# Patient Record
Sex: FEMALE | Race: BLACK OR AFRICAN AMERICAN | NOT HISPANIC OR LATINO | Employment: FULL TIME | URBAN - METROPOLITAN AREA
[De-identification: names, ages, dates, MRNs, and addresses within clinical notes are randomized per-mention and may not be internally consistent; named-entity substitution may affect disease eponyms.]

---

## 2017-01-13 ENCOUNTER — TRANSCRIBE ORDERS (OUTPATIENT)
Dept: URGENT CARE | Facility: CLINIC | Age: 27
End: 2017-01-13

## 2017-01-13 DIAGNOSIS — S63.501A RIGHT WRIST SPRAIN, INITIAL ENCOUNTER: Primary | ICD-10-CM

## 2017-01-16 ENCOUNTER — TREATMENT (OUTPATIENT)
Dept: PHYSICAL THERAPY | Facility: CLINIC | Age: 27
End: 2017-01-16

## 2017-01-16 DIAGNOSIS — S63.501D WRIST SPRAIN, RIGHT, SUBSEQUENT ENCOUNTER: Primary | ICD-10-CM

## 2017-01-16 PROCEDURE — 97001 PR PHYS THERAPY EVALUATION: CPT | Performed by: PHYSICAL THERAPIST

## 2017-01-16 PROCEDURE — 97110 THERAPEUTIC EXERCISES: CPT | Performed by: PHYSICAL THERAPIST

## 2017-01-16 PROCEDURE — 97033 APP MDLTY 1+IONTPHRSIS EA 15: CPT | Performed by: PHYSICAL THERAPIST

## 2017-01-16 PROCEDURE — A4556 ELECTRODES, PAIR: HCPCS | Performed by: PHYSICAL THERAPIST

## 2017-01-16 NOTE — PROGRESS NOTES
Complete Evaluation  NAME: Tahira Jules     PATIENT MRN: [id number]  DATE: 1/16/2017          PLAN OF CARE      THERAPY ASSESSMENT:  Physical Therapy Diagnosis: R wrist strain/sprin; DeQuervains  Functional Limitations: Lifting, Carrying, Pushing, Pulling, Sleeping, Decreased Strength, Decreased ability to perform critical demands of job  Length of Therapy: 1 month  PT Frequency: PT 3x week  PT Interventions: Therapeutic exercise - AROM, Therapeutic exercise - stretching, Therapeutic exercise - strengthening, Manual Therapy, Hot packs/ Moist heat, Cold packs, Ultrasound, Iontophoresis,  training, Home Exercise Program  Patient Agrees with Plan of Care: Yes      REHAB POTENTIAL: Prognosis: Good, Fair            SHORT TERM GOALS: Short Term Goals Time for Goal Achievement Options: 1 week, Patient demonstrates independence with exercise: Yes, Other Goal: dec pain with ROM to < 4/10, Other Goal - Progress: review body mechanics with work sim         LONG TERM GOALS:  Long Term Goals Time for Goal Achievement Options: 4 weeks, Patient will increase ROM to WNL's: Yes (painfree), Patient will increase RUE strength to:: R4+/5, Patient tolerates continuous job/sport activity for: 20 mins, Other Goal: min to no tenderness    Therapy Exercise 41999 14 minutes and Other Procedure CPT 45916 minutes 8    Timed Code Treatment: 22   Minutes     Total Treatment Time: 56      Minutes      PT SIGNATURE: Blanquita Guzman, LEANNA   DATE TREATMENT INITIATED: 1/16/2017    Initial Certification  Certification Period: 4/16/2017  I certify that the therapy services are furnished while this patient is under my care.  The services outlined above are required by this patient, and will be reviewed every 90 days.     PHYSICIAN: Joy Lyons MD      DATE:     Please sign and return via fax to [phone]. Thank you, [facility].            Background  Start Time: 0101  Date of Injury/Surgery:  17  Insidious: No  Mechanism of Injury: wringing a mop - heard a crck in wrist  Past Medical History: unremarkable  Medications: Naproxen  Allergies: NKDAs or latex  Occupation: facility management  Dominant Side: Right  Precautions/Work Restrictions: 5 lbs  Recent Diagnosis Tests: X-RAY  Diagnostic Test Results: negative  Prior Level of Function: unrestricted  Pain Assessment  Pain Location Site: radial wrist/FA, thumb  Pain Ratin (distressing)  Symptoms  Describe Behavior Symptoms: a little better  When Symptoms Occur: Worse in p.m.  Pain Increases with:: repetive use or movement of R hand  Pain Decreases with:: meds and brace  Behavior of Symptoms  Describe Behavior Symptoms: Constant  Pain Interrupts Sleep: Yes  Sleep Position: Side-lying L  Patient Goals  Goals: get better  Pt Participated in POC and Goals: Yes      General  Palpation: TTP all around wrist, dustin radial; ABP, EPL, anatomical snuff box  Observation: thumb spica splint                RUE Strength  R Shoulder Flexion: 4+/5  R Shoulder ABduction: 4+/5  R Elbow Flexion: 5/5  R Elbow Extension: 5/5  R Forearm Pronation: 4/5  R Forearm Supination: 4+/5  R Wrist Flexion: 4+/5  R Wrist Extension: 5/5  R Wrist Radial Deviation: 4/5  R Wrist Ulnar Deviation: 4+/5  R Hand  Strength: 28  R Hand Pinch Strength: 8.5              LUE Strength  L Hand  Strength: 35  L Hand Pinch Strength: 8.5                    Forearm, Wrist and Special Tests  Valgus Stress: Positive  Varus Stress: Positive  Finkelstein's Test: Positive            Assessment:  Physical Therapy Diagnosis: R wrist strain/sprin; DeQuervains  Functional Limitations: Lifting, Carrying, Pushing, Pulling, Sleeping, Decreased Strength, Decreased ability to perform critical demands of job  Length of Therapy: 1 month  PT Frequency: PT 3x week  PT Interventions: Therapeutic exercise - AROM, Therapeutic exercise - stretching, Therapeutic exercise - strengthening, Manual Therapy, Hot packs/  Moist heat, Cold packs, Ultrasound, Iontophoresis,  training, Home Exercise Program  Patient Agrees with Plan of Care: Yes

## 2017-01-16 NOTE — PATIENT INSTRUCTIONS
Educated about Dx and exam findings, rationale and POC. Gave handout on HEP with instructions.  Patient educated on Iontopatch - purpose, wear, doff, precautions

## 2017-01-18 ENCOUNTER — TREATMENT (OUTPATIENT)
Dept: PHYSICAL THERAPY | Facility: CLINIC | Age: 27
End: 2017-01-18

## 2017-01-18 DIAGNOSIS — S63.501D WRIST SPRAIN, RIGHT, SUBSEQUENT ENCOUNTER: Primary | ICD-10-CM

## 2017-01-18 PROCEDURE — A4556 ELECTRODES, PAIR: HCPCS | Performed by: PHYSICAL THERAPIST

## 2017-01-18 PROCEDURE — 97110 THERAPEUTIC EXERCISES: CPT | Performed by: PHYSICAL THERAPIST

## 2017-01-18 NOTE — PROGRESS NOTES
Daily Progress Note    Time In: 1400      Time Out: 1450    Subjective   Pt reports that she had so much pain that she couldn't move before. The patch helped some and now she can move a little with the brace.  Objective      PROCEDURES AND MODALITIES:  Parrafin  Paraffin 48450 Location: R wrist   Rx Minutes: 10                        Iontophoresis 78808  Milliamps: 80  Dexamethasone used: Yes  Patch Type: 16 hour         EXERCISE  Exercise 1  Exercise Name 1: isometrics wrist/FA (6 way)   Sets/Reps 1: 5  Time: 5s  Exercise 1 Completed: Yes  Exercise 2  Exercise Name 2:  with ball  Sets/Reps 2: 9  Time 2: 5s  Exercise 2 Completed: Yes Exercise 3  Exercise Name 3: scap ret  Sets/Reps 3: 10  Time 3: 5s Exercise 4  Exercise Name 4: thumb AROM flex/ext; abd/add  Sets/Reps 4: 15 each Exercise 5  Exercise Name 5: wrist flex/ext stretch  Sets/Reps 5: 4  Time 5: 20s each  Exercise 5 Completed: Yes Exercise 6  Exercise Name 6: thumb flexion stretch  Sets/Reps 6: 4  Time 6: 20s  Exercise 6 Completed: Yes   Exercise 7  Exercise Name 7: wrist 4 way AROM  Equipment/Resistance 7: 20 each  Exercise 7 Completed: Yes                                       Therapy Exercise 61903 30 minutes    Timed Code Treatment: 30 Minutes and Total Treatment Time: 50 Minutes    Assessment/Plan   Session limited greatly by pain. Deferred estim due to reports of pain with vibration. Demonstrated AROM WNL but with pain.  Progress per Plan of Care             Brianna Nagel, PT  Physical Therapist

## 2017-01-20 ENCOUNTER — TREATMENT (OUTPATIENT)
Dept: PHYSICAL THERAPY | Facility: CLINIC | Age: 27
End: 2017-01-20

## 2017-01-20 DIAGNOSIS — S63.501D WRIST SPRAIN, RIGHT, SUBSEQUENT ENCOUNTER: Primary | ICD-10-CM

## 2017-01-20 PROCEDURE — 97530 THERAPEUTIC ACTIVITIES: CPT | Performed by: PHYSICAL THERAPIST

## 2017-01-20 PROCEDURE — 97018 PARAFFIN BATH THERAPY: CPT | Performed by: PHYSICAL THERAPIST

## 2017-01-20 PROCEDURE — 97110 THERAPEUTIC EXERCISES: CPT | Performed by: PHYSICAL THERAPIST

## 2017-01-20 NOTE — PROGRESS NOTES
MD Progress Note  1/20/2017  Joy Lyons MD    Re: Tahira Jules, 1990  ________________________________________________________________    Tahira Jules, has attended 3/3 PT sessions.     Treatment has consisted of therapeutic exercise, iontophoresis and paraffin. We attempted US but she did not tolerate.    Tahira Jules states: her wrist is hurting very bad and has to use R hand to do some things at work - can't do 8 hrs of work with just my L hand; rates pain at 6/10 now and 9-10/10 at work at times    OBSERVATION: General  Palpation: TTP radial wrist, anatomical snuff box       AROM:         ~ WNL except extension sl limited and all with moderate pain         PROM:                     Strength:  RUE Strength  R Hand  Strength: 15  R Hand Pinch Strength: 7    LUE Strength  L Hand  Strength: 30  L Hand Pinch Strength: 7.5                  Special Tests:  + Scaphoid Shift; pain with vibration and US over area of scaphoid                     Function:  NT in clinic due to reports of severe pain    Assessment: No noted improvement with continued c/o mod-severe pain.  Low tolerance for exercises and treatment.  Signs/sxs of possible scaphoid derangement?    Plan: Please advise after your exam.    Therapy Exercise 90077 10 minutes, Ther Act 74730 9 minutes and Other Procedure CPT paraffin minutes 10     Timed Code Treatment: 29 Minutes  Total Treatment Time: 32 Minutes    Blanquita Guzman PT  Physical Therapist      Thank you for this referral